# Patient Record
Sex: MALE | Race: WHITE | NOT HISPANIC OR LATINO | Employment: FULL TIME | URBAN - METROPOLITAN AREA
[De-identification: names, ages, dates, MRNs, and addresses within clinical notes are randomized per-mention and may not be internally consistent; named-entity substitution may affect disease eponyms.]

---

## 2017-08-29 ENCOUNTER — OFFICE VISIT (OUTPATIENT)
Dept: RETAIL CLINIC | Facility: CLINIC | Age: 42
End: 2017-08-29

## 2017-08-29 VITALS
RESPIRATION RATE: 18 BRPM | OXYGEN SATURATION: 97 % | DIASTOLIC BLOOD PRESSURE: 84 MMHG | HEART RATE: 82 BPM | SYSTOLIC BLOOD PRESSURE: 118 MMHG | TEMPERATURE: 99 F

## 2017-08-29 DIAGNOSIS — B34.9 VIRAL ILLNESS: Primary | ICD-10-CM

## 2017-08-29 PROCEDURE — 99203 OFFICE O/P NEW LOW 30 MIN: CPT | Performed by: NURSE PRACTITIONER

## 2017-08-29 RX ORDER — ESCITALOPRAM OXALATE 20 MG/1
20 TABLET ORAL DAILY
COMMUNITY

## 2017-08-29 NOTE — PATIENT INSTRUCTIONS
Viral Respiratory Infection  A viral respiratory infection is an illness that affects parts of the body used for breathing, like the lungs, nose, and throat. It is caused by a germ called a virus.  Some examples of this kind of infection are:  · A cold.  · The flu (influenza).  · A respiratory syncytial virus (RSV) infection.  HOW DO I KNOW IF I HAVE THIS INFECTION?  Most of the time this infection causes:  · A stuffy or runny nose.  · Yellow or green fluid in the nose.  · A cough.  · Sneezing.  · Tiredness (fatigue).  · Achy muscles.  · A sore throat.  · Sweating or chills.  · A fever.  · A headache.  HOW IS THIS INFECTION TREATED?  If the flu is diagnosed early, it may be treated with an antiviral medicine. This medicine shortens the length of time a person has symptoms. Symptoms may be treated with over-the-counter and prescription medicines, such as:  · Expectorants. These make it easier to cough up mucus.  · Decongestant nasal sprays.  Doctors do not prescribe antibiotic medicines for viral infections. They do not work with this kind of infection.  HOW DO I KNOW IF I SHOULD STAY HOME?  To keep others from getting sick, stay home if you have:  · A fever.  · A lasting cough.  · A sore throat.  · A runny nose.  · Sneezing.  · Muscles aches.  · Headaches.  · Tiredness.  · Weakness.  · Chills.  · Sweating.  · An upset stomach (nausea).  HOME CARE   · Rest as much as possible.  · Take over-the-counter and prescription medicines only as told by your doctor.  · Drink enough fluid to keep your pee (urine) clear or pale yellow.  · Gargle with salt water. Do this 3-4 times per day or as needed. To make a salt-water mixture, dissolve ½-1 tsp of salt in 1 cup of warm water. Make sure the salt dissolves all the way.  · Use nose drops made from salt water. This helps with stuffiness (congestion). It also helps soften the skin around your nose.  · Do not drink alcohol.  · Do not use tobacco products, including cigarettes,  chewing tobacco, and e-cigarettes. If you need help quitting, ask your doctor.  GET HELP IF:  · Your symptoms last for 10 days or longer.  · Your symptoms get worse over time.  · You have a fever.  · You have very bad pain in your face or forehead.  · Parts of your jaw or neck become very swollen.  GET HELP RIGHT AWAY IF:  · You feel pain or pressure in your chest.  · You have shortness of breath.  · You faint or feel like you will faint.  · You keep throwing up (vomiting).  · You feel confused.     This information is not intended to replace advice given to you by your health care provider. Make sure you discuss any questions you have with your health care provider.     Document Released: 11/30/2009 Document Revised: 04/10/2017 Document Reviewed: 05/25/2016  [employer] Interactive Patient Education ©2017 [employer]

## 2017-08-29 NOTE — PROGRESS NOTES
Subjective   Patient ID: Guilherme Milligan is a 42 y.o. male presents with   Chief Complaint   Patient presents with   • Fatigue     x 3 days   • bodyaches     lower back   • Chills   • Cough     slight       Fatigue   This is a new (Patient reports being around kids over the weekend who are now not feeling well either. ) problem. The current episode started in the past 7 days (3d). Associated symptoms include chills (improving), coughing (slightly), diaphoresis (improving), fatigue and a fever (low grade). Nothing aggravates the symptoms. He has tried nothing for the symptoms. The treatment provided no relief.   Cough   This is a new problem. The current episode started in the past 7 days (3d). The problem has been unchanged. Episode frequency: rarely. The cough is non-productive. Associated symptoms include chills (improving) and a fever (low grade). Pertinent negatives include no shortness of breath or wheezing. Nothing aggravates the symptoms. He has tried nothing for the symptoms. The treatment provided no relief.       No Known Allergies    The following portions of the patient's history were reviewed and updated as appropriate: allergies, current medications, past family history, past medical history, past social history, past surgical history and problem list.      Review of Systems   Constitutional: Positive for chills (improving), diaphoresis (improving), fatigue and fever (low grade).   HENT: Negative.    Respiratory: Positive for cough (slightly). Negative for chest tightness, shortness of breath and wheezing.    Cardiovascular: Negative.    Gastrointestinal: Negative.    Genitourinary: Negative for decreased urine volume and difficulty urinating.   Musculoskeletal:        Bodyaches         Objective     Vitals:    08/29/17 1052   BP: 118/84   Pulse: 82   Resp: 18   Temp: 99 °F (37.2 °C)   SpO2: 97%         Physical Exam   Constitutional: He is oriented to person, place, and time. He appears well-developed and  well-nourished. He does not appear ill. No distress.   HENT:   Head: Normocephalic.   Right Ear: Hearing, tympanic membrane, external ear and ear canal normal.   Left Ear: Hearing, tympanic membrane, external ear and ear canal normal.   Nose: Nose normal. No rhinorrhea or sinus tenderness.   Mouth/Throat: Mucous membranes are normal. Posterior oropharyngeal erythema present. Tonsils are 1+ on the right. Tonsils are 1+ on the left. No tonsillar exudate.   Eyes: Conjunctivae are normal.   Sclera white.   Neck: No tracheal deviation present.   Cardiovascular: Normal rate, regular rhythm, S1 normal, S2 normal and normal heart sounds.    Pulmonary/Chest: Effort normal and breath sounds normal. No accessory muscle usage. No respiratory distress.   Abdominal: Soft. Bowel sounds are normal. There is no tenderness.   Lymphadenopathy:     He has no cervical adenopathy.   Neurological: He is alert and oriented to person, place, and time.   Skin: Skin is warm and dry.   Vitals reviewed.        Guilherme was seen today for fatigue, bodyaches, chills and cough.    Diagnoses and all orders for this visit:    Viral illness        Tylenol/ibuprofen for pain or fever. Increase water intake to at least 8 cups per day.   Follow-up with Primary Care Physician in 48-72 hours if these symptoms worsen or fail to improve as anticipated. Patient verbalizes understanding.

## 2020-01-21 ENCOUNTER — TRANSCRIBE ORDERS (OUTPATIENT)
Dept: ADMINISTRATIVE | Facility: HOSPITAL | Age: 45
End: 2020-01-21

## 2020-01-21 DIAGNOSIS — S62.102B: Primary | ICD-10-CM

## 2020-02-05 ENCOUNTER — HOSPITAL ENCOUNTER (OUTPATIENT)
Dept: CT IMAGING | Facility: HOSPITAL | Age: 45
Discharge: HOME | End: 2020-02-05
Admitting: SURGERY

## 2020-02-05 DIAGNOSIS — S62.102B: ICD-10-CM

## 2020-02-05 PROCEDURE — 73200 CT UPPER EXTREMITY W/O DYE: CPT
